# Patient Record
Sex: FEMALE | Race: WHITE
[De-identification: names, ages, dates, MRNs, and addresses within clinical notes are randomized per-mention and may not be internally consistent; named-entity substitution may affect disease eponyms.]

---

## 2017-01-29 ENCOUNTER — HOSPITAL ENCOUNTER (OUTPATIENT)
Dept: HOSPITAL 62 - LC | Age: 27
Discharge: HOME | End: 2017-01-29
Attending: OBSTETRICS & GYNECOLOGY
Payer: MEDICAID

## 2017-01-29 DIAGNOSIS — Z3A.20: ICD-10-CM

## 2017-01-29 DIAGNOSIS — R10.9: ICD-10-CM

## 2017-01-29 DIAGNOSIS — O26.892: Primary | ICD-10-CM

## 2017-01-29 LAB
APPEARANCE UR: (no result)
BARBITURATES UR QL SCN: NEGATIVE
BILIRUB UR QL STRIP: NEGATIVE
GLUCOSE UR STRIP-MCNC: NEGATIVE MG/DL
KETONES UR STRIP-MCNC: NEGATIVE MG/DL
METHADONE UR QL SCN: NEGATIVE
NITRITE UR QL STRIP: NEGATIVE
PCP UR QL SCN: NEGATIVE
PH UR STRIP: 7 [PH] (ref 5–9)
PROT UR STRIP-MCNC: NEGATIVE MG/DL
SP GR UR STRIP: 1.01
URINE OPIATES LOW: NEGATIVE
UROBILINOGEN UR-MCNC: NEGATIVE MG/DL (ref ?–2)

## 2017-01-29 PROCEDURE — 87086 URINE CULTURE/COLONY COUNT: CPT

## 2017-01-29 PROCEDURE — 87088 URINE BACTERIA CULTURE: CPT

## 2017-01-29 PROCEDURE — 80307 DRUG TEST PRSMV CHEM ANLYZR: CPT

## 2017-01-29 PROCEDURE — 81001 URINALYSIS AUTO W/SCOPE: CPT

## 2017-01-29 PROCEDURE — 87186 SC STD MICRODIL/AGAR DIL: CPT

## 2017-01-29 PROCEDURE — 4A1HXCZ MONITORING OF PRODUCTS OF CONCEPTION, CARDIAC RATE, EXTERNAL APPROACH: ICD-10-PCS | Performed by: OBSTETRICS & GYNECOLOGY

## 2017-02-02 ENCOUNTER — HOSPITAL ENCOUNTER (OUTPATIENT)
Dept: HOSPITAL 62 - 2N | Age: 27
Setting detail: OBSERVATION
LOS: 1 days | Discharge: HOME | End: 2017-02-03
Attending: OBSTETRICS & GYNECOLOGY | Admitting: OBSTETRICS & GYNECOLOGY
Payer: MEDICAID

## 2017-02-02 DIAGNOSIS — O23.02: Primary | ICD-10-CM

## 2017-02-02 DIAGNOSIS — Z86.19: ICD-10-CM

## 2017-02-02 DIAGNOSIS — Z3A.21: ICD-10-CM

## 2017-02-02 DIAGNOSIS — O99.332: ICD-10-CM

## 2017-02-02 DIAGNOSIS — F17.210: ICD-10-CM

## 2017-02-02 DIAGNOSIS — Z88.0: ICD-10-CM

## 2017-02-02 DIAGNOSIS — Z90.49: ICD-10-CM

## 2017-02-02 LAB
ALBUMIN SERPL-MCNC: 3.7 G/DL (ref 3.5–5)
ALP SERPL-CCNC: 80 U/L (ref 38–126)
ALT SERPL-CCNC: 24 U/L (ref 9–52)
ANION GAP SERPL CALC-SCNC: 10 MMOL/L (ref 5–19)
AST SERPL-CCNC: 14 U/L (ref 14–36)
BASOPHILS NFR BLD MANUAL: 0 % (ref 0–2)
BILIRUB DIRECT SERPL-MCNC: 0 MG/DL (ref 0–0.3)
BILIRUB SERPL-MCNC: 0.7 MG/DL (ref 0.2–1.3)
BUN SERPL-MCNC: 7 MG/DL (ref 7–20)
CALCIUM: 9.1 MG/DL (ref 8.4–10.2)
CHLORIDE SERPL-SCNC: 106 MMOL/L (ref 98–107)
CO2 SERPL-SCNC: 21 MMOL/L (ref 22–30)
CREAT SERPL-MCNC: 0.56 MG/DL (ref 0.52–1.25)
EOSINOPHIL NFR BLD MANUAL: 0 % (ref 0–6)
ERYTHROCYTE [DISTWIDTH] IN BLOOD BY AUTOMATED COUNT: 12.7 % (ref 11.5–14)
GLUCOSE SERPL-MCNC: 84 MG/DL (ref 75–110)
HCT VFR BLD CALC: 34.9 % (ref 36–47)
HGB BLD-MCNC: 12.1 G/DL (ref 12–15.5)
HGB HCT DIFFERENCE: 1.4
MCH RBC QN AUTO: 31.5 PG (ref 27–33.4)
MCHC RBC AUTO-ENTMCNC: 34.7 G/DL (ref 32–36)
MCV RBC AUTO: 91 FL (ref 80–97)
NEUTS BAND NFR BLD MANUAL: 1 % (ref 3–5)
POTASSIUM SERPL-SCNC: 4 MMOL/L (ref 3.6–5)
PROT SERPL-MCNC: 6.6 G/DL (ref 6.3–8.2)
RBC # BLD AUTO: 3.85 10^6/UL (ref 3.72–5.28)
RBC MORPH BLD: (no result)
SODIUM SERPL-SCNC: 137.4 MMOL/L (ref 137–145)
TOTAL CELLS COUNTED BLD: 100
VARIANT LYMPHS NFR BLD MANUAL: 11 % (ref 13–45)
WBC # BLD AUTO: 20.5 10^3/UL (ref 4–10.5)

## 2017-02-02 PROCEDURE — 80053 COMPREHEN METABOLIC PANEL: CPT

## 2017-02-02 PROCEDURE — 36415 COLL VENOUS BLD VENIPUNCTURE: CPT

## 2017-02-02 PROCEDURE — G0379 DIRECT REFER HOSPITAL OBSERV: HCPCS

## 2017-02-02 PROCEDURE — 85025 COMPLETE CBC W/AUTO DIFF WBC: CPT

## 2017-02-02 PROCEDURE — G0378 HOSPITAL OBSERVATION PER HR: HCPCS

## 2017-02-02 RX ADMIN — OXYCODONE AND ACETAMINOPHEN PRN TAB: 5; 325 TABLET ORAL at 16:23

## 2017-02-02 RX ADMIN — SODIUM CHLORIDE, SODIUM LACTATE, POTASSIUM CHLORIDE, AND CALCIUM CHLORIDE PRN ML: .6; .31; .03; .02 INJECTION, SOLUTION INTRAVENOUS at 11:43

## 2017-02-02 RX ADMIN — SODIUM CHLORIDE, SODIUM LACTATE, POTASSIUM CHLORIDE, AND CALCIUM CHLORIDE PRN ML: .6; .31; .03; .02 INJECTION, SOLUTION INTRAVENOUS at 20:50

## 2017-02-02 RX ADMIN — CEFAZOLIN SODIUM SCH ML: 2 SOLUTION INTRAVENOUS at 23:45

## 2017-02-02 RX ADMIN — MORPHINE SULFATE PRN MG: 10 INJECTION INTRAMUSCULAR; INTRAVENOUS; SUBCUTANEOUS at 20:47

## 2017-02-02 RX ADMIN — CEFAZOLIN SODIUM SCH ML: 2 SOLUTION INTRAVENOUS at 17:49

## 2017-02-03 VITALS — SYSTOLIC BLOOD PRESSURE: 108 MMHG | DIASTOLIC BLOOD PRESSURE: 60 MMHG

## 2017-02-03 RX ADMIN — CEFAZOLIN SODIUM SCH ML: 2 SOLUTION INTRAVENOUS at 11:53

## 2017-02-03 RX ADMIN — MORPHINE SULFATE PRN MG: 10 INJECTION INTRAMUSCULAR; INTRAVENOUS; SUBCUTANEOUS at 05:35

## 2017-02-03 RX ADMIN — SODIUM CHLORIDE, SODIUM LACTATE, POTASSIUM CHLORIDE, AND CALCIUM CHLORIDE PRN ML: .6; .31; .03; .02 INJECTION, SOLUTION INTRAVENOUS at 05:35

## 2017-02-03 RX ADMIN — CEFAZOLIN SODIUM SCH ML: 2 SOLUTION INTRAVENOUS at 05:35

## 2017-02-03 RX ADMIN — OXYCODONE AND ACETAMINOPHEN PRN TAB: 5; 325 TABLET ORAL at 03:08

## 2017-02-03 NOTE — PROGRESS NOTE
Provider Note


Provider Note: 


S: Pt desires to be d/c home, states symptoms have resolved feels better. 

Denies fever, chills, and back pain, states active fetus. Denies vaginal 

bleeding and leaking of fluid.


O: VSS, afebrile


    s/p IV abx


    Reassuring fetal status


    No CVA tenderness


    Lungs CTA


A: IUP at 21 weeks


    Pylonephtritis


P: d/c home today


    Macrobid 100 mg bid


    Return to clinic if symptoms return


    Reviewed ER prec.

## 2017-02-03 NOTE — PDOC DISCHARGE SUMMARY
General





- Admit/Disc Date/PCP


Admission Date/Primary Care Provider: 


  02/02/17 10:20





  IKM LINDSEY, 





Discharge Date: 02/03/17





- Discharge Diagnosis


(1) Pyelonephritis affecting pregnancy in second trimester


Is this a current diagnosis for this admission?: YesSummary: 


Pt d/c home 


f/u at [facility] next week


macrobid 100mg bid X5 days, rx with pt


increase po fluid











- Additional Information


Home Medications: 








Nitrofurantoin Monohyd/M-Cryst [Macrobid 100 mg Capsule] 100 mg PO BID 01/29/17 


Prenatal Vit/Iron Fumarate/FA [Prenatal Tablet] 1 tab PO DAILY 01/29/17 











History of Present Illness


History of Present Illness: 


ENRIKE OLIVARES is a 26 year old female








Physical Exam





- Physical Exam


Vital Signs: 


 











Temp Pulse Resp BP Pulse Ox


 


 98.3 F   88   15   90/53 L  100 


 


 02/03/17 12:02  02/03/17 12:02  02/03/17 12:02  02/03/17 12:02  02/03/17 08:03








 Intake & Output











 02/02/17 02/03/17 02/04/17





 06:59 06:59 06:59


 


Intake Total  300 


 


Output Total  1400 


 


Balance  -1100 


 


Weight  74.84 kg 











General appearance: PRESENT: no acute distress, cooperative


Head exam: PRESENT: atraumatic


Neurological exam: PRESENT: alert, altered, oriented to time, oriented to 

situation





Result


Laboratory Results: 


 





 02/02/17 11:05 





 02/02/17 11:05 











Plan


Discharge Plan: 


d/c home undelivered in stable condition


f/u at [facility] tomorrow

## 2017-04-19 ENCOUNTER — HOSPITAL ENCOUNTER (OUTPATIENT)
Dept: HOSPITAL 62 - LC | Age: 27
Discharge: HOME | End: 2017-04-19
Attending: OBSTETRICS & GYNECOLOGY
Payer: MEDICAID

## 2017-04-19 DIAGNOSIS — Z3A.32: ICD-10-CM

## 2017-04-19 DIAGNOSIS — O47.03: Primary | ICD-10-CM

## 2017-04-19 LAB
A1 MICROGLOB PLACENTAL VAG QL: NEGATIVE
APPEARANCE UR: (no result)
BARBITURATES UR QL SCN: NEGATIVE
BILIRUB UR QL STRIP: NEGATIVE
GLUCOSE UR STRIP-MCNC: NEGATIVE MG/DL
KETONES UR STRIP-MCNC: NEGATIVE MG/DL
METHADONE UR QL SCN: NEGATIVE
NITRITE UR QL STRIP: NEGATIVE
PCP UR QL SCN: NEGATIVE
PH UR STRIP: 6 [PH] (ref 5–9)
PROT UR STRIP-MCNC: NEGATIVE MG/DL
SP GR UR STRIP: 1.02
URINE OPIATES LOW: NEGATIVE
UROBILINOGEN UR-MCNC: NEGATIVE MG/DL (ref ?–2)

## 2017-04-19 PROCEDURE — 81001 URINALYSIS AUTO W/SCOPE: CPT

## 2017-04-19 PROCEDURE — 80307 DRUG TEST PRSMV CHEM ANLYZR: CPT

## 2017-04-19 PROCEDURE — 59025 FETAL NON-STRESS TEST: CPT

## 2017-04-19 PROCEDURE — 84112 EVAL AMNIOTIC FLUID PROTEIN: CPT

## 2017-04-19 PROCEDURE — 4A1HXCZ MONITORING OF PRODUCTS OF CONCEPTION, CARDIAC RATE, EXTERNAL APPROACH: ICD-10-PCS | Performed by: OBSTETRICS & GYNECOLOGY

## 2017-04-19 NOTE — L&D FLOW SHEET
=================================================================

LD Flowsheet

=================================================================

Datetime Report Generated by CPN: 04/19/2017 20:00

   

   

=================================================================

Datetime: 04/19/2017 19:48

=================================================================

   

   

=================================================================

Uterine Activity

=================================================================

   

 Monitor Mode:  External; Palpation (Crystal Ann, RN)

 Frequency (min):  0 (Crystal Franklin, RN)

 Resting Tone (Palpate):  Relaxed (Crystal Franklin, RN)

   

=================================================================

Fetal Assessment A

=================================================================

   

 Monitor Mode:  External US (Crystal Franklin, RN)

 FHR Baseline Rate :  130 (Crystal Franklin, RN)

 Variability:  Moderate 6-25 bpm (Crystal Ann, RN)

 Accelerations:  15X15 (Crystal Ann, RN)

   

=================================================================

Datetime: 04/19/2017 19:29

=================================================================

   

   

=================================================================

Uterine Activity

=================================================================

   

 Monitor Mode:  External; Palpation (Crystal Franklin, RN)

 Frequency (min):  0 (Crystal Ann, RN)

 Resting Tone (Palpate):  Relaxed (Crystal Ann, RN)

   

=================================================================

Fetal Assessment A

=================================================================

   

 Monitor Mode:  External US (Crystal Ann, RN)

 FHR Baseline Rate :  135 (Crystal Franklin, RN)

 Variability:  Moderate 6-25 bpm (Crystal Franklin, RN)

 Accelerations:  15X15 (Crystal Ann, RN)

   

=================================================================

Patient Care

=================================================================

   

 Patient Position/Activity:  Right Lateral (Crystal Franklin, RN)

   

=================================================================

Datetime: 04/19/2017 19:16

=================================================================

   

   

=================================================================

Maternal Assessment

=================================================================

   

 Level of Consciousness:  Fully Conscious (Crystal Franklin, RN)

 DTR's/Clonus:  DTRs 1+; No Clonus (Crystal Franklin, RN)

 Headache:  Denies (Crystal Franklin, RN)

 Breath Sounds, Left:  Clear and Equal (Crystal Franklin, RN)

 Breath Sounds, Right:  Clear and Equal (Crystal Franklin, RN)

 Nausea/Vomiting:  Denies (Crystal Ann, RN)

 RUQ Epigastric Pain:  Denies (Crystal Ann, RN)

   

=================================================================

Datetime: 04/19/2017 19:06

=================================================================

   

   

=================================================================

Uterine Activity

=================================================================

   

 Monitor Mode:  External; Palpation (Crystal Franklin, RN)

 Monitor Interventions for UA:  [place] Adjusted (Crystal Franklin, RN)

 Frequency (min):  0 (Crystal Franklin, RN)

 Resting Tone (Palpate):  Relaxed (Crystal Ann, RN)

   

=================================================================

Fetal Assessment A

=================================================================

   

 Monitor Mode:  External US (Crystal Ann, RN)

 FHR Baseline Rate :  135 (Crystal Ann, RN)

 Variability:  Moderate 6-25 bpm (Crystal Franklin, RN)

 Pain Presence:  None/Denies (Crystal Ann, RN)

 Pain Type:  N/A (Crystal Ann, RN)

   

=================================================================

Communication

=================================================================

   

 LaborFlag:  Antepartum (QS system process)

   

=================================================================

Datetime: 04/19/2017 18:55

=================================================================

   

   

=================================================================

Vaginal Exam

=================================================================

   

 Membranes Ruptured Date/Time:  04/19/2017 16:30 (Shannan Bellavance, RNC)

 Membranes Rupture Method:  Spontaneous (Annotations: patient states

   that she thinks that her water is broken No fluid seen at this time)

   (Shannan Bellavance, RNC)

 Amniotic Fluid Color:  Clear (Shannan Bellavance, RNC)

 Amniotic Fluid Amount:  None (Shannan Bellavance, RNC)

 Vaginal Bleeding:  None (Shannan Bellavance, RNC)

 Pool:  Negative (Shannan Bellavance, RNC)

   

=================================================================

Datetime: 04/19/2017 18:52

=================================================================

   

   

=================================================================

Vital Signs

=================================================================

   

 NBP Sys/Carolee/Mean (mmHg):  109 (QS system process)

:  61 (QS system process)

:  77 (QS system process)

 Pulse:  87 (QS system process)

 Respirations:  16 (Shannan Bellavance, RNC)

 Temperature (F):  98.3 (Shannan Bellavance, RNC)

 Temperature (C):  36.8 (QS system process)

 Temperature Route:  Oral (Shannan Bellavance, RNC)

   

=================================================================

Uterine Activity

=================================================================

   

 Monitor Mode:  External (Shannan Bellavance, RNC)

 Monitor Interventions for UA:  [place] Adjusted (Shannan Bellavance, RNC)

 Frequency (min):  0 (Shannan Bellavance, RNC)

 Resting Tone (Palpate):  Relaxed (Shannan Bellavance, RNC)

   

=================================================================

Fetal Assessment A

=================================================================

   

 Monitor Mode:  External US (Shannan Bellavance, RNC)

 Monitor Interventions for FHR:  Ultrasound Adjusted (Shannan Bellavance,

   RNC)

 FHR Baseline Rate :  135 (Shannan Bellavance, RNC)

 Variability:  Moderate 6-25 bpm (Shannan Bellavance, RNC)

 Accelerations:  None (Shannan Bellavance, RNC)

 Decelerations:  None (Shannan Bellavance, RNC)

   

=================================================================

Pain

=================================================================

   

 Pain Scale:  3 (Shannan Bellavance, RNC)

 Pain Presence:  Intermittent (Shannan Bellavance, RNC)

 Pain Type:  Pressure (Shannan Bellavance, RNC)

 Pain Location:  Back (Shannan Bellavance, RNC)

 Pain Goal:  0 (Shannan Bellavance, RNC)

 Pain Relief Measures:  Comfort Measures (Shannan Bellavance, RNC)

 Pain Coping:  Talking Through Contractions (Shannan Bellavance, RNC)

 Pain Assessment Comments:  pressure when standing and discomfort when

   trying to sleep for the last month (Shannan Bellavance, RNC)

   

=================================================================

Maternal Assessment

=================================================================

   

 Level of Consciousness:  Fully Conscious (Shannan Bellavance, RNC)

 DTR's/Clonus:  DTRs 2+; No Clonus (Shannan Bellavance, RNC)

 Headache:  Denies (Shannan Bellavance, RNC)

 Breath Sounds, Left:  Clear and Equal (Shannan Bellavance, RNC)

 Breath Sounds, Right:  Clear and Equal (Shannan Bellavance, RNC)

 Nausea/Vomiting:  Denies (Shannan Bellavance, RNC)

 RUQ Epigastric Pain:  Denies (Shannan Bellavance, RNC)

   

=================================================================

Communication

=================================================================

   

 LaborFlag:  Antepartum (QS system process)

## 2017-04-19 NOTE — L&D FLOW SHEET
=================================================================

LD Flowsheet

=================================================================

Datetime Report Generated by CPN: 04/19/2017 20:00

   

   

=================================================================

Datetime: 04/19/2017 19:48

=================================================================

   

   

=================================================================

Uterine Activity

=================================================================

   

 Monitor Mode:  External; Palpation (Crystal Ann, RN)

 Frequency (min):  0 (Crystal Letts, RN)

 Resting Tone (Palpate):  Relaxed (Crystal Letts, RN)

   

=================================================================

Fetal Assessment A

=================================================================

   

 Monitor Mode:  External US (Crystal Letts, RN)

 FHR Baseline Rate :  130 (Crystal Letts, RN)

 Variability:  Moderate 6-25 bpm (Crystal Ann, RN)

 Accelerations:  15X15 (Crystal Ann, RN)

   

=================================================================

Datetime: 04/19/2017 19:29

=================================================================

   

   

=================================================================

Uterine Activity

=================================================================

   

 Monitor Mode:  External; Palpation (Crystal Letts, RN)

 Frequency (min):  0 (Crystal Ann, RN)

 Resting Tone (Palpate):  Relaxed (Crystal Ann, RN)

   

=================================================================

Fetal Assessment A

=================================================================

   

 Monitor Mode:  External US (Crystal Ann, RN)

 FHR Baseline Rate :  135 (Crystal Letts, RN)

 Variability:  Moderate 6-25 bpm (Crystal Letts, RN)

 Accelerations:  15X15 (Crystal Ann, RN)

   

=================================================================

Patient Care

=================================================================

   

 Patient Position/Activity:  Right Lateral (Crystal Letts, RN)

   

=================================================================

Datetime: 04/19/2017 19:16

=================================================================

   

   

=================================================================

Maternal Assessment

=================================================================

   

 Level of Consciousness:  Fully Conscious (Crystal Letts, RN)

 DTR's/Clonus:  DTRs 1+; No Clonus (Crystal Letts, RN)

 Headache:  Denies (Crystal Letts, RN)

 Breath Sounds, Left:  Clear and Equal (Crystal Letts, RN)

 Breath Sounds, Right:  Clear and Equal (Crystal Letts, RN)

 Nausea/Vomiting:  Denies (Crystal Ann, RN)

 RUQ Epigastric Pain:  Denies (Crystal Ann, RN)

   

=================================================================

Datetime: 04/19/2017 19:06

=================================================================

   

   

=================================================================

Uterine Activity

=================================================================

   

 Monitor Mode:  External; Palpation (Crystal Letts, RN)

 Monitor Interventions for UA:  [place] Adjusted (Crystal Letts, RN)

 Frequency (min):  0 (Crystal Letts, RN)

 Resting Tone (Palpate):  Relaxed (Crystal Ann, RN)

   

=================================================================

Fetal Assessment A

=================================================================

   

 Monitor Mode:  External US (Crystal Ann, RN)

 FHR Baseline Rate :  135 (Crystal Ann, RN)

 Variability:  Moderate 6-25 bpm (Crystal Letts, RN)

 Pain Presence:  None/Denies (Crystal Ann, RN)

 Pain Type:  N/A (Crystal Ann, RN)

   

=================================================================

Communication

=================================================================

   

 LaborFlag:  Antepartum (QS system process)

   

=================================================================

Datetime: 04/19/2017 18:55

=================================================================

   

   

=================================================================

Vaginal Exam

=================================================================

   

 Membranes Ruptured Date/Time:  04/19/2017 16:30 (Shannan Bellavance, RNC)

 Membranes Rupture Method:  Spontaneous (Annotations: patient states

   that she thinks that her water is broken No fluid seen at this time)

   (Shannan Bellavance, RNC)

 Amniotic Fluid Color:  Clear (Shannan Bellavance, RNC)

 Amniotic Fluid Amount:  None (Shannan Bellavance, RNC)

 Vaginal Bleeding:  None (Shannan Bellavance, RNC)

 Pool:  Negative (Shannan Bellavance, RNC)

   

=================================================================

Datetime: 04/19/2017 18:52

=================================================================

   

   

=================================================================

Vital Signs

=================================================================

   

 NBP Sys/Carolee/Mean (mmHg):  109 (QS system process)

:  61 (QS system process)

:  77 (QS system process)

 Pulse:  87 (QS system process)

 Respirations:  16 (Shannan Bellavance, RNC)

 Temperature (F):  98.3 (Shannan Bellavance, RNC)

 Temperature (C):  36.8 (QS system process)

 Temperature Route:  Oral (Shannan Bellavance, RNC)

   

=================================================================

Uterine Activity

=================================================================

   

 Monitor Mode:  External (Shannan Bellavance, RNC)

 Monitor Interventions for UA:  [place] Adjusted (Shannan Bellavance, RNC)

 Frequency (min):  0 (Shannan Bellavance, RNC)

 Resting Tone (Palpate):  Relaxed (Shannan Bellavance, RNC)

   

=================================================================

Fetal Assessment A

=================================================================

   

 Monitor Mode:  External US (Shannan Bellavance, RNC)

 Monitor Interventions for FHR:  Ultrasound Adjusted (Shannan Bellavance,

   RNC)

 FHR Baseline Rate :  135 (Shannan Bellavance, RNC)

 Variability:  Moderate 6-25 bpm (Shannan Bellavance, RNC)

 Accelerations:  None (Shannan Bellavance, RNC)

 Decelerations:  None (Shannan Bellavance, RNC)

   

=================================================================

Pain

=================================================================

   

 Pain Scale:  3 (Shannan Bellavance, RNC)

 Pain Presence:  Intermittent (Shannan Bellavance, RNC)

 Pain Type:  Pressure (Shannan Bellavance, RNC)

 Pain Location:  Back (Shannan Bellavance, RNC)

 Pain Goal:  0 (Shannan Bellavance, RNC)

 Pain Relief Measures:  Comfort Measures (Shannan Bellavance, RNC)

 Pain Coping:  Talking Through Contractions (Shannan Bellavance, RNC)

 Pain Assessment Comments:  pressure when standing and discomfort when

   trying to sleep for the last month (Shannan Bellavance, RNC)

   

=================================================================

Maternal Assessment

=================================================================

   

 Level of Consciousness:  Fully Conscious (Shannan Bellavance, RNC)

 DTR's/Clonus:  DTRs 2+; No Clonus (Shannan Bellavance, RNC)

 Headache:  Denies (Shannan Bellavance, RNC)

 Breath Sounds, Left:  Clear and Equal (Shannan Bellavance, RNC)

 Breath Sounds, Right:  Clear and Equal (Shannan Bellavance, RNC)

 Nausea/Vomiting:  Denies (Shannan Bellavance, RNC)

 RUQ Epigastric Pain:  Denies (Shannan Bellavance, RNC)

   

=================================================================

Communication

=================================================================

   

 LaborFlag:  Antepartum (QS system process)

   

=================================================================

Datetime: 01/29/2017 17:00

=================================================================

   

   

=================================================================

Communication

=================================================================

   

 LaborFlag:  Antepartum (QS system process)

   

=================================================================

Datetime: 01/29/2017 16:34

=================================================================

   

   

=================================================================

Communication

=================================================================

   

 LaborFlag:  Antepartum (QS system process)

   

=================================================================

Datetime: 01/29/2017 16:11

=================================================================

   

   

=================================================================

Communication

=================================================================

   

 LaborFlag:  Antepartum (QS system process)

   

=================================================================

Datetime: 01/29/2017 16:04

=================================================================

   

 Temperature (C):  36.8 (QS system process)

   

=================================================================

Communication

=================================================================

   

 LaborFlag:  Antepartum (QS system process)

## 2017-05-30 ENCOUNTER — HOSPITAL ENCOUNTER (OUTPATIENT)
Dept: HOSPITAL 62 - LC | Age: 27
LOS: 1 days | Discharge: HOME | End: 2017-05-31
Attending: OBSTETRICS & GYNECOLOGY
Payer: MEDICAID

## 2017-05-30 DIAGNOSIS — Z3A.38: ICD-10-CM

## 2017-05-30 DIAGNOSIS — O47.1: Primary | ICD-10-CM

## 2017-05-30 LAB
APPEARANCE UR: (no result)
BILIRUB UR QL STRIP: NEGATIVE
GLUCOSE UR STRIP-MCNC: NEGATIVE MG/DL
KETONES UR STRIP-MCNC: NEGATIVE MG/DL
NITRITE UR QL STRIP: NEGATIVE
PH UR STRIP: 7 [PH] (ref 5–9)
PROT UR STRIP-MCNC: NEGATIVE MG/DL
SP GR UR STRIP: 1.01
UROBILINOGEN UR-MCNC: NEGATIVE MG/DL (ref ?–2)

## 2017-05-30 PROCEDURE — 80307 DRUG TEST PRSMV CHEM ANLYZR: CPT

## 2017-05-30 PROCEDURE — 4A1HXCZ MONITORING OF PRODUCTS OF CONCEPTION, CARDIAC RATE, EXTERNAL APPROACH: ICD-10-PCS | Performed by: OBSTETRICS & GYNECOLOGY

## 2017-05-30 PROCEDURE — 59025 FETAL NON-STRESS TEST: CPT

## 2017-05-30 PROCEDURE — 81005 URINALYSIS: CPT

## 2017-05-31 LAB
BARBITURATES UR QL SCN: NEGATIVE
METHADONE UR QL SCN: NEGATIVE
PCP UR QL SCN: NEGATIVE
URINE OPIATES LOW: NEGATIVE

## 2017-06-02 ENCOUNTER — HOSPITAL ENCOUNTER (OUTPATIENT)
Dept: HOSPITAL 62 - LC | Age: 27
Discharge: HOME | End: 2017-06-02
Attending: OBSTETRICS & GYNECOLOGY
Payer: MEDICAID

## 2017-06-02 DIAGNOSIS — O47.1: Primary | ICD-10-CM

## 2017-06-02 DIAGNOSIS — Z3A.38: ICD-10-CM

## 2017-06-02 LAB
A1 MICROGLOB PLACENTAL VAG QL: NEGATIVE
APPEARANCE UR: (no result)
BARBITURATES UR QL SCN: NEGATIVE
BASOPHILS # BLD AUTO: 0.1 10^3/UL (ref 0–0.2)
BASOPHILS NFR BLD AUTO: 0.6 % (ref 0–2)
BILIRUB UR QL STRIP: NEGATIVE
EOSINOPHIL # BLD AUTO: 0.4 10^3/UL (ref 0–0.6)
EOSINOPHIL NFR BLD AUTO: 2.3 % (ref 0–6)
ERYTHROCYTE [DISTWIDTH] IN BLOOD BY AUTOMATED COUNT: 13.3 % (ref 11.5–14)
GLUCOSE UR STRIP-MCNC: NEGATIVE MG/DL
HCT VFR BLD CALC: 34.5 % (ref 36–47)
HGB BLD-MCNC: 11.4 G/DL (ref 12–15.5)
HGB HCT DIFFERENCE: -0.3
KETONES UR STRIP-MCNC: NEGATIVE MG/DL
LYMPHOCYTES # BLD AUTO: 4.7 10^3/UL (ref 0.5–4.7)
LYMPHOCYTES NFR BLD AUTO: 25.5 % (ref 13–45)
MCH RBC QN AUTO: 30.1 PG (ref 27–33.4)
MCHC RBC AUTO-ENTMCNC: 33.1 G/DL (ref 32–36)
MCV RBC AUTO: 91 FL (ref 80–97)
METHADONE UR QL SCN: NEGATIVE
MONOCYTES # BLD AUTO: 1.1 10^3/UL (ref 0.1–1.4)
MONOCYTES NFR BLD AUTO: 6.2 % (ref 3–13)
NEUTROPHILS # BLD AUTO: 11.9 10^3/UL (ref 1.7–8.2)
NEUTS SEG NFR BLD AUTO: 65.4 % (ref 42–78)
NITRITE UR QL STRIP: NEGATIVE
PCP UR QL SCN: NEGATIVE
PH UR STRIP: 6 [PH] (ref 5–9)
PROT UR STRIP-MCNC: NEGATIVE MG/DL
RBC # BLD AUTO: 3.79 10^6/UL (ref 3.72–5.28)
SP GR UR STRIP: 1.02
URINE OPIATES LOW: NEGATIVE
UROBILINOGEN UR-MCNC: 2 MG/DL (ref ?–2)
WBC # BLD AUTO: 18.3 10^3/UL (ref 4–10.5)

## 2017-06-02 PROCEDURE — 36415 COLL VENOUS BLD VENIPUNCTURE: CPT

## 2017-06-02 PROCEDURE — 80307 DRUG TEST PRSMV CHEM ANLYZR: CPT

## 2017-06-02 PROCEDURE — 81005 URINALYSIS: CPT

## 2017-06-02 PROCEDURE — 86592 SYPHILIS TEST NON-TREP QUAL: CPT

## 2017-06-02 PROCEDURE — 85025 COMPLETE CBC W/AUTO DIFF WBC: CPT

## 2017-06-02 PROCEDURE — 86850 RBC ANTIBODY SCREEN: CPT

## 2017-06-02 PROCEDURE — 86900 BLOOD TYPING SEROLOGIC ABO: CPT

## 2017-06-02 PROCEDURE — 4A1HXCZ MONITORING OF PRODUCTS OF CONCEPTION, CARDIAC RATE, EXTERNAL APPROACH: ICD-10-PCS | Performed by: OBSTETRICS & GYNECOLOGY

## 2017-06-02 PROCEDURE — 84112 EVAL AMNIOTIC FLUID PROTEIN: CPT

## 2017-06-02 PROCEDURE — 59025 FETAL NON-STRESS TEST: CPT

## 2017-06-02 PROCEDURE — 86901 BLOOD TYPING SEROLOGIC RH(D): CPT

## 2017-06-02 RX ADMIN — SODIUM CHLORIDE, SODIUM LACTATE, POTASSIUM CHLORIDE, AND CALCIUM CHLORIDE PRN ML: .6; .31; .03; .02 INJECTION, SOLUTION INTRAVENOUS at 22:49

## 2017-06-02 RX ADMIN — SODIUM CHLORIDE, SODIUM LACTATE, POTASSIUM CHLORIDE, AND CALCIUM CHLORIDE PRN ML: .6; .31; .03; .02 INJECTION, SOLUTION INTRAVENOUS at 22:23

## 2017-06-02 NOTE — NON STRESS TEST REPORT
=================================================================

Non Stress Test

=================================================================

Datetime Report Generated by CPN: 06/02/2017 23:40

   

   

=================================================================

DEMOGRAPHIC

=================================================================

   

Test Number:  1

EGA NST:  38.3

EGA NST:  32.1

   

=================================================================

INDICATION

=================================================================

   

Indication for Study:  Ordered by Provider

Indication for Study:  Ordered by Provider

   

=================================================================

VITAL SIGNS

=================================================================

   

Temperature - NST:  97.8

Pulse - NST:  75

RESP - NST:  16

NBPSYS NST:  104

NBPDIA NST:  59

   

=================================================================

URINE RESULTS

=================================================================

   

Urine Protein, NST:  Negative

Urine Ketones - NST:  Negative

Urine Glucose - NST:  Negative

Urine Blood - NST:  Negative

   

=================================================================

MONITORING

=================================================================

   

Monitor Explained:  Monitor Explained; Test Explained; Patient

   Verbalized Understanding

Monitor Explained:  Monitor Explained; Test Explained; Patient

   Verbalized Understanding

Time on Monitor:  06/02/2017 21:30

Time on Monitor:  04/19/2017 18:50

Time off Monitor:  06/02/2017 22:00

Time off Monitor:  04/19/2017 19:59

NST Duration:  30

NST Duration:  69

   

=================================================================

NST INTERVENTIONS

=================================================================

   

NST Interventions:  PO Hydration; IV Fluids; Reposition Patient

NST Interventions:  PO Hydration; Reposition Patient

Physician Notified NST:  Dr. Stock

Physician Notified NST:  Dia

BABY A:  [id number]

   

=================================================================

BABY A

=================================================================

   

Fetal Movement :  Present

Fetal Movement :  Present

Contraction Frequency :  x3

Contraction Frequency :  0

FHR Baseline :  135

FHR Baseline :  135

Accelerations :  15X15

Accelerations :  15X15

Decelerations :  None

Decelerations :  None

Variability :  Moderate 6-25bpm

Variability :  Moderate 6-25bpm

NST Review:  Meets Criteria for Reactive NST

NST Review:  Meets Criteria for Reactive NST

NST Review and Verified By :  ASTER HOPPER Results:  Reactive

NST Results:  Reactive

   

=================================================================

NST REPORT

=================================================================

   

Report Trigger:  Send Report

## 2017-06-05 ENCOUNTER — HOSPITAL ENCOUNTER (INPATIENT)
Dept: HOSPITAL 62 - LC | Age: 27
LOS: 2 days | Discharge: HOME | End: 2017-06-07
Attending: OBSTETRICS & GYNECOLOGY | Admitting: OBSTETRICS & GYNECOLOGY
Payer: MEDICAID

## 2017-06-05 DIAGNOSIS — Z91.030: ICD-10-CM

## 2017-06-05 DIAGNOSIS — O34.211: Primary | ICD-10-CM

## 2017-06-05 DIAGNOSIS — Z88.1: ICD-10-CM

## 2017-06-05 DIAGNOSIS — F17.210: ICD-10-CM

## 2017-06-05 DIAGNOSIS — Z3A.38: ICD-10-CM

## 2017-06-05 DIAGNOSIS — Z30.2: ICD-10-CM

## 2017-06-05 DIAGNOSIS — Z88.0: ICD-10-CM

## 2017-06-05 LAB
APPEARANCE UR: (no result)
BARBITURATES UR QL SCN: NEGATIVE
BASOPHILS # BLD AUTO: 0.1 10^3/UL (ref 0–0.2)
BASOPHILS NFR BLD AUTO: 0.4 % (ref 0–2)
BILIRUB UR QL STRIP: NEGATIVE
EOSINOPHIL # BLD AUTO: 0.2 10^3/UL (ref 0–0.6)
EOSINOPHIL NFR BLD AUTO: 0.9 % (ref 0–6)
ERYTHROCYTE [DISTWIDTH] IN BLOOD BY AUTOMATED COUNT: 13.2 % (ref 11.5–14)
GLUCOSE UR STRIP-MCNC: NEGATIVE MG/DL
HCT VFR BLD CALC: 33.9 % (ref 36–47)
HGB BLD-MCNC: 11.4 G/DL (ref 12–15.5)
HGB HCT DIFFERENCE: 0.3
KETONES UR STRIP-MCNC: NEGATIVE MG/DL
LYMPHOCYTES # BLD AUTO: 3.2 10^3/UL (ref 0.5–4.7)
LYMPHOCYTES NFR BLD AUTO: 17.2 % (ref 13–45)
MCH RBC QN AUTO: 30.1 PG (ref 27–33.4)
MCHC RBC AUTO-ENTMCNC: 33.6 G/DL (ref 32–36)
MCV RBC AUTO: 89 FL (ref 80–97)
METHADONE UR QL SCN: NEGATIVE
MONOCYTES # BLD AUTO: 0.9 10^3/UL (ref 0.1–1.4)
MONOCYTES NFR BLD AUTO: 4.8 % (ref 3–13)
NEUTROPHILS # BLD AUTO: 14.4 10^3/UL (ref 1.7–8.2)
NEUTS SEG NFR BLD AUTO: 76.7 % (ref 42–78)
NITRITE UR QL STRIP: NEGATIVE
PCP UR QL SCN: NEGATIVE
PH UR STRIP: 6 [PH] (ref 5–9)
PROT UR STRIP-MCNC: 30 MG/DL
RBC # BLD AUTO: 3.79 10^6/UL (ref 3.72–5.28)
SP GR UR STRIP: 1.03
URINE OPIATES LOW: NEGATIVE
UROBILINOGEN UR-MCNC: 2 MG/DL (ref ?–2)
WBC # BLD AUTO: 18.7 10^3/UL (ref 4–10.5)

## 2017-06-05 PROCEDURE — 86592 SYPHILIS TEST NON-TREP QUAL: CPT

## 2017-06-05 PROCEDURE — 85027 COMPLETE CBC AUTOMATED: CPT

## 2017-06-05 PROCEDURE — 94799 UNLISTED PULMONARY SVC/PX: CPT

## 2017-06-05 PROCEDURE — 36415 COLL VENOUS BLD VENIPUNCTURE: CPT

## 2017-06-05 PROCEDURE — 0UL70CZ OCCLUSION OF BILATERAL FALLOPIAN TUBES WITH EXTRALUMINAL DEVICE, OPEN APPROACH: ICD-10-PCS | Performed by: OBSTETRICS & GYNECOLOGY

## 2017-06-05 PROCEDURE — 85025 COMPLETE CBC W/AUTO DIFF WBC: CPT

## 2017-06-05 PROCEDURE — 86901 BLOOD TYPING SEROLOGIC RH(D): CPT

## 2017-06-05 PROCEDURE — 4A1HXCZ MONITORING OF PRODUCTS OF CONCEPTION, CARDIAC RATE, EXTERNAL APPROACH: ICD-10-PCS | Performed by: OBSTETRICS & GYNECOLOGY

## 2017-06-05 PROCEDURE — 86900 BLOOD TYPING SEROLOGIC ABO: CPT

## 2017-06-05 PROCEDURE — 86850 RBC ANTIBODY SCREEN: CPT

## 2017-06-05 PROCEDURE — 80307 DRUG TEST PRSMV CHEM ANLYZR: CPT

## 2017-06-05 PROCEDURE — 81005 URINALYSIS: CPT

## 2017-06-05 RX ADMIN — DOCUSATE SODIUM SCH MG: 100 CAPSULE, LIQUID FILLED ORAL at 18:04

## 2017-06-05 RX ADMIN — KETOROLAC TROMETHAMINE SCH MG: 30 INJECTION, SOLUTION INTRAMUSCULAR at 15:50

## 2017-06-05 RX ADMIN — HYDROMORPHONE HYDROCHLORIDE PRN MG: 2 INJECTION INTRAMUSCULAR; INTRAVENOUS; SUBCUTANEOUS at 13:17

## 2017-06-05 RX ADMIN — OXYCODONE AND ACETAMINOPHEN PRN TAB: 5; 325 TABLET ORAL at 21:10

## 2017-06-05 RX ADMIN — KETOROLAC TROMETHAMINE SCH MG: 30 INJECTION, SOLUTION INTRAMUSCULAR at 21:10

## 2017-06-05 RX ADMIN — HYDROMORPHONE HYDROCHLORIDE PRN MG: 2 INJECTION INTRAMUSCULAR; INTRAVENOUS; SUBCUTANEOUS at 10:37

## 2017-06-05 RX ADMIN — DOCUSATE SODIUM SCH MG: 100 CAPSULE, LIQUID FILLED ORAL at 18:05

## 2017-06-05 RX ADMIN — OXYCODONE AND ACETAMINOPHEN PRN TAB: 5; 325 TABLET ORAL at 15:51

## 2017-06-05 RX ADMIN — PRENATAL W/O A VIT W/ FE FUMARATE-FA CAP 106.5-1 MG SCH CAP: 106.5 CAPSULE ORAL at 18:04

## 2017-06-05 NOTE — OPERATIVE REPORT
Operative Report


DATE OF SURGERY: 17


PREOPERATIVE DIAGNOSIS: Repeat  patient desires tubal ligation


POSTOPERATIVE DIAGNOSIS: Same


OPERATION: Repeat  via low transverse uterine incision and tubal 

ligation with Filshie clips


SURGEON: DEANDRE SANDERS


ANESTHESIA: Spinal


TISSUE REMOVED OR ALTERED: Placenta bilateral tubal ligation with Filshie clip


COMPLICATIONS: 


None


ESTIMATED BLOOD LOSS: 250 cc


INTRAOPERATIVE FINDINGS: Viable male Apgars 8 9 normal uterus and ovaries.  

Left tube is normal right tube has some adhesions surrounding it


PROCEDURE: 


Patient was taken to the OR and placed in supine position after her spinal 

anesthesia.  She is prepared and draped in sterile fashion.  Mendez was placed 

for drainage of the bladder.  Low transverse incision was made and carried down 

the level of the fascia.  The fascial incision was made with knife and extended 

bilaterally with curved Noel scissors.  The fascia was  off the rectus 

muscles using sharp and blunt dissection.  The rectus muscles are  in 

the midline.  The peritoneum was entered without incident.  Bladder blade was 

placed in uterine segment was identified.  A low transverse incision was made 

creating a bladder flap.  Bladder blade was placed low transverse uterine 

incision was made with the csafe knife and extended with fingertips.  The baby 

was delivered with some fundal pressure.  Mouth and nose were suctioned free.  

The cord is doubly clamped and cut.  Baby is passed off to the pediatrician in 

attendance.  The placenta was manually extracted with trailing membranes.  The 

uterus was externalized  wrapped in a moist lap sponge.  Uterine contents wiped 

free.  Uterus was closed with a running locking layer of 0 chromic suture using 

the second layer to imbricate the first completing a double layer closure of 

the uterus.  The serosa was closed with a running 2-0 chromic stitch. Filshie 

clips were placed across the mid portion of each fallopian tube.  The pelvis 

was irrigated and suctioned free of fluid the uterus was replaced in the 

abdomen.  The abdominal wall peritoneum was closed with running 2-0 chromic 

stitch.  Fascia was closed with a running 0 Vicryl in 2 segments.  Tyree's 

layer was brought together with 0 plain gut stitch and the skin was closed with 

running subcuticular 4-0 undyed Vicryl stitch.  The wound was dressed mother 

and baby did well.

## 2017-06-06 LAB
ERYTHROCYTE [DISTWIDTH] IN BLOOD BY AUTOMATED COUNT: 13.5 % (ref 11.5–14)
HCT VFR BLD CALC: 29.9 % (ref 36–47)
HGB BLD-MCNC: 10 G/DL (ref 12–15.5)
HGB HCT DIFFERENCE: 0.1
MCH RBC QN AUTO: 30.5 PG (ref 27–33.4)
MCHC RBC AUTO-ENTMCNC: 33.4 G/DL (ref 32–36)
MCV RBC AUTO: 92 FL (ref 80–97)
RBC # BLD AUTO: 3.27 10^6/UL (ref 3.72–5.28)
WBC # BLD AUTO: 16 10^3/UL (ref 4–10.5)

## 2017-06-06 RX ADMIN — OXYCODONE AND ACETAMINOPHEN PRN TAB: 5; 325 TABLET ORAL at 01:50

## 2017-06-06 RX ADMIN — DOCUSATE SODIUM SCH MG: 100 CAPSULE, LIQUID FILLED ORAL at 18:27

## 2017-06-06 RX ADMIN — DOCUSATE SODIUM SCH MG: 100 CAPSULE, LIQUID FILLED ORAL at 09:09

## 2017-06-06 RX ADMIN — PRENATAL W/O A VIT W/ FE FUMARATE-FA CAP 106.5-1 MG SCH CAP: 106.5 CAPSULE ORAL at 09:09

## 2017-06-06 RX ADMIN — KETOROLAC TROMETHAMINE SCH MG: 30 INJECTION, SOLUTION INTRAMUSCULAR at 06:00

## 2017-06-06 RX ADMIN — OXYCODONE AND ACETAMINOPHEN PRN TAB: 5; 325 TABLET ORAL at 16:10

## 2017-06-06 RX ADMIN — OXYCODONE AND ACETAMINOPHEN PRN TAB: 5; 325 TABLET ORAL at 07:25

## 2017-06-06 RX ADMIN — OXYCODONE AND ACETAMINOPHEN PRN TAB: 5; 325 TABLET ORAL at 23:30

## 2017-06-06 NOTE — ADMISSION PHYSICAL
=================================================================



=================================================================

Datetime Report Generated by CPN: 2017 11:54

   

   

=================================================================

CURRENT ADMISSION

=================================================================

   

Chief Complaint:  Uterine Contractions; Scheduled  Section

Indication for Induction:  Not Applicable

Admit Plan:  Admit to Unit; Initiate  Section Protocol

   

=================================================================

ALLERGIES

=================================================================

   

Medication Allergies:  Yes

Medication Allergies:  Penicillins/SV/Anaphylaxis (2017);

   amoxicillin/SV/Anaphylaxis (2017); bee venom protein (honey

   bee) (2017)

Medication Allergies:  Penicillins/SV/Anaphylaxis (2017);

   amoxicillin/SV/Anaphylaxis (2017)

Medication Allergies:  Penicillins/SV/throat closes s (04/10/2016);

   Amoxicillin (04/10/2016)

Latex:  No Latex Allergies

Food Allergies:  None

Environmental Allergies:  bees

   

=================================================================

OBSTETRICAL HISTORY

=================================================================

   

EDC:  2017 00:00

:  5

Para:  2

Para:  2

Term:  2

:  0

SAB:  2

IAB:  0

Ectopic:  0

Livin

Cesareans:  2

VBACs:  0

Multiple Births:  0

Gestational Diabetes:  No

Rh Sensitization:  No

Incompetent Cervix:  No

JOYCE:  No

Infertility:  No

ART Treatment:  No

Uterine Anomaly:  No

IUGR:  No

Hx Previous C/S:  Yes

Macrosomia:  No

Hx Loss/Stillborn:  No

PIH:  No

Hx  Death:  No

Placenta Previa/Abruption:  No

Depression/PP Depression:  Yes

PTL/PROM:  No

Post Partum Hemorrhage:  No

Current Pregnancy Procedures:  NST

Obstetrical History Comments:  G-1 , 39 wks, 

   G-2 

   G-3 , 39 wks, 

   G-4 SAB 

   G-5 current pregnancy

   

=================================================================

***SEE PRENATAL RECORDS***

=================================================================

   

Alcohol:  No

Marijuana :  No

Cocaine:  No

Other Illicit Drugs:  No

Cigarettes:  Current Some Day Smoker. [id number]

   

=================================================================

MEDICAL HISTORY

=================================================================

   

Diabetes:  No

Blood Transfusion:  No

Pulmonary Disease (Asthma, TB):  No

Breast Disease:  No

Hypertension:  No

Gyn Surgery:  No

Heart Disease:  No

Hosp/Surgery:  Yes

Autoimmune Disorder:  No

Anesthetic Complications:  No

Kidney Disease:  Yes

Abnormal Pap Smear:  No

Neuro/Epilepsy:  No

Psychiatric Disorders:  No

Other Medical Diseases:  Yes

Hepatitis/Liver Disease:  No

Significant Family History:  No

Varicosities/Phlebitis:  No

Trauma/Violence :  No

Thyroid Dysfunction:  No

Medical History Comments:  tonsilectomy, appendectomy, csection x 2,

   smoker 1/2-1 pack a day, 3/4 teeth removed (no partials yet), heart

   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

   of mouth, kidney infection frequently, mono as a child, scolosis,

   and ruptured disc in back above pelvic bone 

   

=================================================================

INFECTIOUS HISTORY

=================================================================

   

Gonorrhea:  No

Genital Herpes:  No

Chlamydia:  No

Tuberculosis:  No

Syphilis:  No

Hepatitis:  No

HIV/AIDS Exposure:  No

Rash or Viral Illness:  No

HPV:  Yes

Infectious History Comments:  Condyloma 

   

=================================================================

PHYSICAL EXAM

=================================================================

   

General:  Normal

HEENT:  Normal

Neurologic:  Normal

Thyroid:  Normal

Heart:  Normal

Lungs:  Normal

Breast:  Deferred

Back:  Normal

Abdomen:  Normal

Genitourinary Exam:  Normal

Extremities:  Normal

DTRs:  Normal

Pelvic Type:  Adequate

Vital Signs:  Reviewed

   

=================================================================

VAGINAL EXAM

=================================================================

   

Dilatation:  2

Effacement:  65

Station:  -2

   

=================================================================

MEMBRANES

=================================================================

   

Pooling:  Negative

Membranes:  Intact

   

=================================================================

FETUS A

=================================================================

   

Monitoring:  External US

FHR- Baseline:  130

Variability:  Moderate 6-25bpm

Accelerations:  10X10

Decelerations:  None

FHR Category:  Category I

Fetal Presentation:  Vertex

   

=================================================================

PLANS FOR LABOR AND DELIVERY

=================================================================

   

Labor and Delivery:  None

Pain Management:  Spinal

Feeding Preference:  Breast

Benefit of Breast Feed Discussed:  Yes

Circumcision:  Yes

## 2017-06-06 NOTE — DELIVERY SUMMARY
=================================================================

Del Sum A-C

=================================================================

Datetime Report Generated by CPN: 2017 11:54

   

   

=================================================================

DELIVERY PERSONNEL

=================================================================

   

DELIVERY PERSONNEL:  [phone]

Delivery Doctor::  Edson Stock MD

Labor and Delivery Nurse::  Dee Flores, ASTER

Scrub Tech/CNA:  Maren Mcmanus, CST

Scrub Tech/CNA:  Gerardo Colin, ST

Additional Personnel: :  Heather Lopez CNA II

   

=================================================================

MATERNAL INFORMATION

=================================================================

   

Delivery Anesthesia:  Spinal

Medications After Delivery:  Pitocin Bolus-Please Comment

Maternal Complications:  None

   

=================================================================

LABOR SUMMARY

=================================================================

   

EDC:  2017 00:00

No. Babies in Womb:  1

No. Babies in Womb:  1

 Attempted:  No

Labor Anesthesia:  None

   

=================================================================

LABOR INFORMATION

=================================================================

   

Reason for Induction:  Not Applicable

Oxytocin:  N/A

Group B Beta Strep:  negative

Steroids Given:  None

Reason Steroids Not Administered:  Not Applicable

   

=================================================================

STAGES OF LABOR

=================================================================

   

Stage 3 hr:  0

Stage 3 min:  1

   

=================================================================

CSECTION DELIVERY

=================================================================

   

Primary Indication:  Repeat Elective

CSection Urgency:  Non-Scheduled

CSection Incidence:  Repeat

Labor:  No Labor

Elective:  Nonelective

CSection Incision:  Lower Uterine Transverse

Other Sterilization Procedure:  filshie clips

   

=================================================================

BABY A INFORMATION

=================================================================

   

Infant Delivery Date/Time:  2017 08:18

Method of Delivery:  

Born in Route :  No

:  N/A

Forceps:  N/A

Vacuum Extraction:  N/A

Shoulder Dystocia :  No

   

=================================================================

PRESENTATION/POSITION BABY A

=================================================================

   

Presentation:  Cephalic

Cephalic Presentation:  Face

Breech Presentation:  N/A

   

=================================================================

PLACENTA INFORMATION BABY A

=================================================================

   

Placenta Delivery Time :  2017 08:19

Placenta Method of Delivery:  Manual Removal

Placenta Status:  Delivered

   

=================================================================

APGAR SCORES BABY A

=================================================================

   

Heart Rate 1 min:  >100 bpm

Resp Effort 1 min:  Good Cry

Reflex Irritability 1 min:  Cough or Sneeze or Pulls Away

Muscle Tone 1 min:  Active Motion

Color 1 min:  Blue/Pale

Resuscitation Effort 1 min:  Tactile Stimulation

APGAR SCORE 1 MIN:  8

Heart Rate 5 min:  >100 bpm

Resp Effort 5 min:  Good Cry

Reflex Irritability 5 min:  Cough or Sneeze or Pulls Away

Muscle Tone 5 min:  Active Motion

Color 5 min:  Body Pink, Extremities Blue

Resuscitation Effort 5 min:  Tactile Stimulation

APGAR SCORE 5 MIN:  9

   

=================================================================

INFANT INFORMATION BABY A

=================================================================

   

Gestational Age at Delivery:  38.6

Gestational Status:  Early Term- 37- 38.6 Weeks

Infant Outcome :  Liveborn

Infant Condition :  Stable

Infant Sex:  Male

   

=================================================================

IDENTIFICATION BABY A

=================================================================

   

Infant Verification Date/Time:  2017 08:21

ID Band Number:  [id number]

Mother's Name Verified:  Yes

Infant Medical Record Number:  [id number]

RN Verifying Infant:  BL ROULUND, RN

Additional Verifying Personnel:  E RAQUEL, RN

   

=================================================================

WEIGHT/LENGTH BABY A

=================================================================

   

Infant Birthweight (gm):  3450

Infant Weight (lb):  7

Infant Weight (oz):  10

Infant Length (in):  19.75

Infant Length (cm):  50.17

   

=================================================================

CORD INFORMATION BABY A

=================================================================

   

No. Cord Vessels:  3

Nuchal Cord :  N/A

Cord Blood Taken:  Yes-For Eval (Mom's Blood Type - or O+)

Infant Suction:  Mouth

   

=================================================================

ASSESSMENT BABY A

=================================================================

   

Infant Complications:  Meconium

Physical Findings at Delivery:  Within Normal Limits

Infant Respirations:  Appears Normal

Skin to Skin:  Yes

Skin to Skin:  Yes-PACU

Skin to Skin Time (min):  75

Skin to Skin Time (min):  45

Neonatologist/ALS Called :  Yes

Infant Care By:  SHANIKA GARCÍA RN

Transferred To:   Nursery

   

=================================================================

BABY B INFORMATION

=================================================================

   

 :  N/A

## 2017-06-06 NOTE — PDOC PROGRESS REPORT
Subjective


Progress Note for:: 17


Subjective:: 


Doing well today.





Physical Exam





- Physical Exam


Vital Signs: 


 











Temp Pulse Resp BP Pulse Ox


 


 98.1 F   62   17   109/64   99 


 


 17 07:27  17 07:27  17 07:27  17 07:27  17 07:27








 Intake & Output











 17





 06:59 06:59 06:59


 


Intake Total  2200 


 


Output Total  1125 


 


Balance  1075 


 


Weight  79 kg 











General appearance: PRESENT: no acute distress


Head exam: PRESENT: atraumatic - dressing dry





Result


Laboratory Results: 


 





 17 07:04 





 











  17





  07:04


 


WBC  16.0 H


 


RBC  3.27 L


 


Hgb  10.0 L


 


Hct  29.9 L


 


MCV  92


 


MCH  30.5


 


MCHC  33.4


 


RDW  13.5


 


Plt Count  297











Impressions: 


Doing well post op.





Assessment & Plan





- Diagnosis


(1) Pregnancy


Qualifiers: 


     Weeks of gestation: 39 weeks        Qualified Code(s): Z3A.39 - 39 weeks 

gestation of pregnancy  


Is this a current diagnosis for this admission?: YesPlan: 


Continue post partum care.








(2) S/P repeat low transverse 


Is this a current diagnosis for this admission?: Yes

## 2017-06-07 VITALS — DIASTOLIC BLOOD PRESSURE: 49 MMHG | SYSTOLIC BLOOD PRESSURE: 94 MMHG

## 2017-06-07 RX ADMIN — OXYCODONE AND ACETAMINOPHEN PRN TAB: 5; 325 TABLET ORAL at 04:41

## 2017-06-07 RX ADMIN — DOCUSATE SODIUM SCH MG: 100 CAPSULE, LIQUID FILLED ORAL at 09:10

## 2017-06-07 RX ADMIN — OXYCODONE AND ACETAMINOPHEN PRN TAB: 5; 325 TABLET ORAL at 09:10

## 2017-06-07 RX ADMIN — PRENATAL W/O A VIT W/ FE FUMARATE-FA CAP 106.5-1 MG SCH CAP: 106.5 CAPSULE ORAL at 09:10

## 2017-06-07 NOTE — PDOC PROGRESS REPORT
Subjective-OB


Subjective: 


Post Delivery Day: 2








26 year old.  Denies any needs at this time, states lochia is stable, pain is 

well controlled, voiding without difficulty, tolerating diet, passing gas








Physical Exam (OB)


Vital Signs: 


 











Temp Pulse Resp BP Pulse Ox


 


 97.9 F   77   16   130/74 H  100 


 


 17 08:35  17 08:35  17 08:35  17 08:35  17 08:35








 Intake & Output











 17





 06:59 06:59 06:59


 


Intake Total 2200 1300 


 


Output Total 1125  


 


Balance 1075 1300 


 


Weight 79 kg  














- PIH/Pre-Eclampsia


DTR's: 2 +


Clonus: Negative


Headache: Absent


Epigastric Pain: No


Visual Changes: No





- 


Dressing Removed: Yes - opsite dressing D&I, no new drainage, some bruising 

noted under incision


Incision: Well Approximated





- Bilateral Tubal Ligation


Dressing Removed: No - honeycomb dressing


Site: Dressing





- Lochia


Lochia Amount: Scant < 10 ml


Lochia Color: Rubra/Red





- Abdomen


Description: Tender, Soft


Hernia Present: No


Fundal Description: Firm, Midline


Fundal Height: u/u - u/2





Objective-Diagnostic


Laboratory: 


 





 17 07:04 











Assessment and Plan(PN)





- Assessment and Plan


(1) Hx of tubal ligation


Is this a current diagnosis for this admission?: Yes





(2) S/P repeat low transverse 


Is this a current diagnosis for this admission?: YesPlan: 


d/c home today


in office follow up next week











- Time Spent with Patient


Time with patient: Less than 15 minutes


Critical Time spent with patient: Less than 15 minutes


Medications reviewed and adjusted accordingly: Yes





- Disposition


Anticipated Discharge: Home


Within: within 24 hours

## 2017-06-07 NOTE — PDOC DISCHARGE SUMMARY
Final Diagnosis


Discharge Date: 17





- Final Diagnosis


(1) Hx of tubal ligation


Is this a current diagnosis for this admission?: Yes





(2) S/P repeat low transverse 


Is this a current diagnosis for this admission?: Yes








Discharge Data





- Discharge Medication


Home Medications: 








Prenatal Vit/Iron Fumarate/FA [Prenatal Tablet] 1 tab PO DAILY 17 


Docusate Sodium [Colace 100 mg Capsule] 100 mg PO BID #60 capsule 17 


Ferrous Sulfate 325 mg PO BID #60 tablet. 17 


Ibuprofen 800 mg PO TID PRN #90 tablet 17 


Oxycodone HCl/Acetaminophen [Percocet 5-325 mg Tablet] 2 tab PO Q4HP PRN #30 

tablet 17 








Gestational Age: 38.6


Reason(s) for Admission: Ceasarean Section-Repeat, Tubal Ligation


Prenatal Procedures: NST


Intrapartum Procedure(s): : Low Cervical, Transverse





- [place] Data


  ** Baby 1 Male


Apgar at 1 minute: 8


Apgar at 5 minutes: 9


Weight: 3450 kg


Home with Mother: Yes


Complications: No





- Diagnosis Test


Laboratory: 


 











Temp Pulse Resp BP Pulse Ox


 


 97.9 F   77   16   130/74 H  100 


 


 17 08:35  17 08:35  17 08:35  17 08:35  17 08:35








 











  17





  02:02 04:40 07:04


 


RBC   3.79  3.27 L


 


Hgb   11.4 L  10.0 L


 


Hct   33.9 L  29.9 L


 


Urine Opiates Screen  NEGATIVE  














- Discharge information/Instructions


Discharge Activity: Activity As Tolerated, No Driving, No Lifting Over 10 Pounds

, Pelvic Rest, No tub bath


Discharge Diet: Regular


Disposition: HOME, SELF-CARE


Follow up with: [facility]


in: 1, Weeks

## 2017-06-11 NOTE — ADMISSION PHYSICAL
=================================================================



=================================================================

Datetime Report Generated by CPN: 2017 23:40

   

   

=================================================================

CURRENT ADMISSION

=================================================================

   

Chief Complaint:  Uterine Contractions; Scheduled  Section

Indication for Induction:  Not Applicable

Admit Plan:  Admit to Unit; Initiate  Section Protocol

   

=================================================================

ALLERGIES

=================================================================

   

Medication Allergies:  Yes

Medication Allergies:  Penicillins/SV/Anaphylaxis (2017);

   amoxicillin/SV/Anaphylaxis (2017); bee venom protein (honey

   bee) (2017)

Medication Allergies:  Penicillins/SV/Anaphylaxis (2017);

   amoxicillin/SV/Anaphylaxis (2017)

Medication Allergies:  Penicillins/SV/throat closes s (04/10/2016);

   Amoxicillin (04/10/2016)

Latex:  No Latex Allergies

Food Allergies:  None

Environmental Allergies:  bees

   

=================================================================

OBSTETRICAL HISTORY

=================================================================

   

EDC:  2017 00:00

:  5

Para:  2

Para:  2

Term:  2

:  0

SAB:  2

IAB:  0

Ectopic:  0

Livin

Cesareans:  2

VBACs:  0

Multiple Births:  0

Gestational Diabetes:  No

Rh Sensitization:  No

Incompetent Cervix:  No

JOYCE:  No

Infertility:  No

ART Treatment:  No

Uterine Anomaly:  No

IUGR:  No

Hx Previous C/S:  Yes

Macrosomia:  No

Hx Loss/Stillborn:  No

PIH:  No

Hx  Death:  No

Placenta Previa/Abruption:  No

Depression/PP Depression:  Yes

PTL/PROM:  No

Post Partum Hemorrhage:  No

Current Pregnancy Procedures:  NST

Obstetrical History Comments:  G-1 , 39 wks, 

   G-2 

   G-3 , 39 wks, 

   G-4 SAB 

   G-5 current pregnancy

   

=================================================================

***SEE PRENATAL RECORDS***

=================================================================

   

Alcohol:  No

Marijuana :  No

Cocaine:  No

Other Illicit Drugs:  No

Cigarettes:  Current Some Day Smoker. [id number]

   

=================================================================

MEDICAL HISTORY

=================================================================

   

Diabetes:  No

Blood Transfusion:  No

Pulmonary Disease (Asthma, TB):  No

Breast Disease:  No

Hypertension:  No

Gyn Surgery:  No

Heart Disease:  No

Hosp/Surgery:  Yes

Autoimmune Disorder:  No

Anesthetic Complications:  No

Kidney Disease:  Yes

Abnormal Pap Smear:  No

Neuro/Epilepsy:  No

Psychiatric Disorders:  No

Other Medical Diseases:  Yes

Hepatitis/Liver Disease:  No

Significant Family History:  No

Varicosities/Phlebitis:  No

Trauma/Violence :  No

Thyroid Dysfunction:  No

Medical History Comments:  tonsilectomy, appendectomy, csection x 2,

   smoker 1/2-1 pack a day, 3/4 teeth removed (no partials yet), heart

   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

   of mouth, kidney infection frequently, mono as a child, scolosis,

   and ruptured disc in back above pelvic bone 

   

=================================================================

INFECTIOUS HISTORY

=================================================================

   

Gonorrhea:  No

Genital Herpes:  No

Chlamydia:  No

Tuberculosis:  No

Syphilis:  No

Hepatitis:  No

HIV/AIDS Exposure:  No

Rash or Viral Illness:  No

HPV:  Yes

Infectious History Comments:  Condyloma 

   

=================================================================

PHYSICAL EXAM

=================================================================

   

General:  Normal

HEENT:  Normal

Neurologic:  Normal

Thyroid:  Normal

Heart:  Normal

Lungs:  Normal

Breast:  Deferred

Back:  Normal

Abdomen:  Normal

Genitourinary Exam:  Normal

Extremities:  Normal

DTRs:  Normal

Pelvic Type:  Adequate

Vital Signs:  Reviewed

   

=================================================================

VAGINAL EXAM

=================================================================

   

Dilatation:  2

Effacement:  65

Station:  -2

   

=================================================================

MEMBRANES

=================================================================

   

Pooling:  Negative

Membranes:  Intact

   

=================================================================

FETUS A

=================================================================

   

EGA:  38.6

Monitoring:  External US

FHR- Baseline:  130

Variability:  Moderate 6-25bpm

Accelerations:  10X10

Decelerations:  None

FHR Category:  Category I

Fetal Presentation:  Vertex

Admit Comment:  She would like a tubal ligation as well.

   

=================================================================

PLANS FOR LABOR AND DELIVERY

=================================================================

   

Labor and Delivery:  None

Pain Management:  Spinal

Feeding Preference:  Breast

Benefit of Breast Feed Discussed:  Yes

Circumcision:  Yes

   

=================================================================

INFORMED CONSENT

=================================================================

   

Signature:  Electronically signed by MD Tariq HerringSMIHANS) on

   2017 at 07:45  with User ID: [id number]

## 2018-09-06 ENCOUNTER — HOSPITAL ENCOUNTER (EMERGENCY)
Dept: HOSPITAL 62 - ER | Age: 28
LOS: 1 days | Discharge: LEFT BEFORE DISCHARGE | End: 2018-09-07
Payer: MEDICAID

## 2018-09-06 VITALS — DIASTOLIC BLOOD PRESSURE: 69 MMHG | SYSTOLIC BLOOD PRESSURE: 118 MMHG

## 2018-09-06 DIAGNOSIS — Z53.21: Primary | ICD-10-CM

## 2023-01-23 NOTE — L&D FLOW SHEET
=================================================================

LD Flowsheet

=================================================================

Datetime Report Generated by CPN: 01/29/2017 18:00

   

   

=================================================================

Datetime: 01/29/2017 17:34

=================================================================

   

 Contraction Comments:  [place] removed (Allyson Cheung RN)

 Instructional Method:  Verbal; Written; Patient Instructed; Verbalized

   Understanding (Allyson Jonatan, RN)

 Plan of Care:  Plan of Care Discussed (Allyson Cheung RN)

 Teaching Comments:  d/c instructions reviewed, see d/c summary. RX

   given for Macrobid (Allyson Cheung, [facility])

   

=================================================================

Datetime: 01/29/2017 17:00

=================================================================

   

 Monitor Mode:  External; Palpation (Allyson Cheung RN)

 Frequency (min):  Occ Irritability (Allyson Cheung RN)

 Quality:  Mild (Allyson Cheung RN)

 Duration (sec):  30 (Allyson Cheung RN)

 Resting Tone (Palpate):  Relaxed (Allyson Cheung RN)

 Pain Scale:  1 (Allyson Cheung RN)

 Pain Presence:  Intermittent (Allyson Cheung RN)

 Pain Type:  Cramping (Allyson Cheung RN)

 Pain Location:  Abdomen (Allyson Cheung RN)

 Pain Goal:  2 (Allyson Cheung RN)

 Pain Relief Measures:  Comfort Measures (Allyson Cheung RN)

 Pain Coping:  Talking Through Contractions (Allyson Cheung RN)

 Pain Assessment Comments:  Reports Ctx feel better after resting and

   hydration (Allyson Cheung RN)

 LaborFlag:  Antepartum (QS system process)

   

=================================================================

Datetime: 01/29/2017 16:48

=================================================================

   

 I/O Interventions:  Up to BR (Allyson Cheung RN)

 Patient Care Comments:  UA sample obtained and sent to lab (Allyson Cheung RN)

   

=================================================================

Datetime: 01/29/2017 16:34

=================================================================

   

 NBP Sys/Carolee/Mean (mmHg):  99 (QS system process)

:  55 (QS system process)

:  70 (QS system process)

 Pulse:  76 (QS system process)

 Respirations:  16 (Allyson Cheung RN)

 LaborFlag:  Antepartum (QS system process)

   

=================================================================

Datetime: 01/29/2017 16:30

=================================================================

   

 Monitor Mode:  External; Palpation (Allyson Cheung RN)

 Frequency (min):  Irritability (Allyson Cheung RN)

 Quality:  Mild (Allyson Cheung RN)

 Duration (sec):  30 (Allyson Cheung RN)

 Resting Tone (Palpate):  Relaxed (Allyson Cheung RN)

   

=================================================================

Datetime: 01/29/2017 16:11

=================================================================

   

 Pain Scale:  3 (Allyson Cheung RN)

 Pain Presence:  Intermittent (Allyson Cheung RN)

 Pain Type:  Cramping (Allyson Cheung RN)

 Pain Location:  Abdomen (Allyson Cheung RN)

 Pain Goal:  2 (Allyson Cheung RN)

 Pain Relief Measures:  Comfort Measures (Allyson Cheung RN)

 Pain Coping:  Talking Through Contractions (Allyson Cheung RN)

 Vaginal Bleeding:  None (Allyson Cheung RN)

 Level of Consciousness:  Fully Conscious (Allyson Cheung RN)

 DTR's/Clonus:  DTRs 1+; No Clonus (Allyson Cheung RN)

 Headache:  Denies (Allyson Cheung RN)

 Nausea/Vomiting:  Denies (Allyson Cheung RN)

 Instructional Method:  Verbal; Patient Instructed; Verbalized

   Understanding (Allyson Jonatan, RN)

 Plan of Care:  Plan of Care Discussed (Allyson Cheung RN)

 Unit Routine:  [place] to Room; Call [place]; Bed; Unit Personnel;

   Handwashing; Fetal Monitoring (Allyson Cheung RN)

 LaborFlag:  Antepartum (QS system process)

   

=================================================================

Datetime: 01/29/2017 16:09

=================================================================

   

 Communication Comments:  Dr Dia at desk. Plan of care discussed,

   patient complaints, will continue to monitor, PO hydrate, obtain UA.

   (Allyson Cheung RN)

   

=================================================================

Datetime: 01/29/2017 16:04

=================================================================

   

 NBP Sys/Carolee/Mean (mmHg):  98 (QS system process)

:  57 (QS system process)

:  69 (QS system process)

 Pulse:  83 (QS system process)

 Respirations:  16 (Allyson Cheung RN)

 Temperature (F):  98.2 (Allyson Cheung RN)

 Temperature (C):  36.8 (QS system process)

 Temperature Route:  Oral (Allyson Cheung RN)

 LaborFlag:  Antepartum (QS system process)

   

=================================================================

Datetime: 01/29/2017 16:00

=================================================================

   

 Monitor Mode:  Doppler (Allyson Cheung RN)

 FHR Baseline Rate :  155 (Allyson Cheung RN)
[place]